# Patient Record
Sex: FEMALE | Race: ASIAN | NOT HISPANIC OR LATINO | Employment: FULL TIME | URBAN - METROPOLITAN AREA
[De-identification: names, ages, dates, MRNs, and addresses within clinical notes are randomized per-mention and may not be internally consistent; named-entity substitution may affect disease eponyms.]

---

## 2024-06-21 ENCOUNTER — OFFICE VISIT (OUTPATIENT)
Dept: OBSTETRICS AND GYNECOLOGY | Facility: CLINIC | Age: 34
End: 2024-06-21
Payer: COMMERCIAL

## 2024-06-21 VITALS — HEART RATE: 94 BPM | DIASTOLIC BLOOD PRESSURE: 72 MMHG | WEIGHT: 136.63 LBS | SYSTOLIC BLOOD PRESSURE: 108 MMHG

## 2024-06-21 DIAGNOSIS — Z76.89 ENCOUNTER TO ESTABLISH CARE: ICD-10-CM

## 2024-06-21 DIAGNOSIS — Z01.419 WELL WOMAN EXAM WITH ROUTINE GYNECOLOGICAL EXAM: Primary | ICD-10-CM

## 2024-06-21 RX ORDER — NORGESTIMATE AND ETHINYL ESTRADIOL 7DAYSX3 28
1 KIT ORAL
COMMUNITY
Start: 2024-04-06

## 2024-06-21 NOTE — PROGRESS NOTES
Subjective:      Patient ID: Florence Ortega is a 34 y.o. female who presents for evaluation today.    Chief Complaint:    Well Woman (Pt states that her last pap smear was in 2022. She just moved here and is going to be trying to another baby soon, so wanted to get established with someone.)      History of Present Illness  Annual Exam (Premenopausal) - Patient presents for annual exam. The patient has no complaints today. The patient is sexually active. GYN screening history: last pap: was normal. The patient wears seatbelts: yes. The patient participates in regular exercise: no. Has the patient ever been transfused or tattooed?: not asked. The patient reports that there is not domestic violence in her life. She has moved here from [place]. She has regular periods on her OCP. She may be seeking another pregnancy in the fall. She denies GI or  problems.    GYN History  Patient's last menstrual period was 2024 (approximate).   Date of Last Pap: Pap smear completed today with HPV co-testing    VITALS  /72   Pulse 94   Wt 62 kg (136 lb 9.6 oz)   LMP 2024 (Approximate)   Weight: 62 kg (136 lb 9.6 oz)         PAST MEDICAL HISTORY  History reviewed. No pertinent past medical history.    PAST SURGICAL HISTORY  Past Surgical History:   Procedure Laterality Date     SECTION  24       SOCIAL HISTORY  Social History     Tobacco Use   Smoking Status Never   Smokeless Tobacco Never   ,   Social History     Substance and Sexual Activity   Alcohol Use Yes    Alcohol/week: 4.0 standard drinks of alcohol    Types: 4 Glasses of wine per week        MEDICATIONS  Outpatient Medications Marked as Taking for the 24 encounter (Office Visit) with Carrie Gamez NP   Medication Sig Dispense Refill    TRI-SPRINTEC, 28, 0.18/0.215/0.25 mg-35 mcg (28) tablet Take 1 tablet by mouth.           Review of Systems   Review of Systems   Constitutional:  Negative for activity change.   Eyes:   Negative for visual disturbance.   Respiratory:  Negative for shortness of breath.    Cardiovascular:  Negative for chest pain.   Gastrointestinal:  Negative for abdominal pain.   Genitourinary:  Negative for menorrhagia, pelvic pain, vaginal bleeding and vaginal discharge.        No abnormal vaginal bleeding   Musculoskeletal:  Negative for back pain.   Integumentary:  Negative for rash and breast mass.   Neurological:  Negative for numbness.   Psychiatric/Behavioral:          No mood disturbance or changes    Breast: Negative for mass          Objective:     Physical Exam:   Constitutional: She is oriented to person, place, and time. She appears well-developed. She is cooperative.    HENT:   Head: Normocephalic.     Neck: Trachea normal. No thyromegaly present.    Cardiovascular:  Normal rate, regular rhythm and normal heart sounds.             Pulmonary/Chest: Effort normal and breath sounds normal. Right breast exhibits no mass, no nipple discharge and no skin change. Left breast exhibits no mass, no nipple discharge and no skin change.        Abdominal: Soft. There is no abdominal tenderness. There is no rebound and no guarding.     Genitourinary:    Vagina and uterus normal.      Pelvic exam was performed with patient supine.   The external female genitalia was normal.     Labial bartholins normal.There is no lesion on the right labia. There is no lesion on the left labia. Cervix is normal. Right adnexum displays no mass and no tenderness. Left adnexum displays no mass and no tenderness. Bleeding: menstrual. Cervix exhibits no discharge.    pap smear completedUterus is not enlarged and not tender.              Lymphadenopathy:        Head (right side): No submental and no submandibular adenopathy present.        Head (left side): No submental and no submandibular adenopathy present.     She has no cervical adenopathy.    Neurological: She is alert and oriented to person, place, and time.    Skin: Skin is  warm.    Psychiatric: She has a normal mood and affect. Her speech is normal and behavior is normal. Thought content normal.          Assessment:        1. Well woman exam with routine gynecological exam       Well woman exam with routine gynecological exam  -     Liquid-based pap smear, screening    Encounter to establish care  -     Ambulatory referral/consult to Internal Medicine; Future; Expected date: 06/28/2024       Plan:     Patient instructed to contact the clinic should any questions or concerns arise prior to her next office visit. Patient is happy with the plan of care at this time, verbalizes understanding and denies outstanding questions.      Pap  Self-breast exams  Birth Control discussed  If you don't hear from the office regarding results within 1 week, please call  Follow up in 1 year for annual or sooner as needed  Chaperone present for exam

## 2024-12-09 ENCOUNTER — TELEPHONE (OUTPATIENT)
Dept: OBSTETRICS AND GYNECOLOGY | Facility: CLINIC | Age: 34
End: 2024-12-09
Payer: COMMERCIAL

## 2024-12-09 NOTE — TELEPHONE ENCOUNTER
Phone call returned to patient.  No answer. I left a message on patient's voicemail  Dr Winkler is no longer taking New OB patients. I referred her to Jesús Amin Leggio and OBDULIA Cook

## 2024-12-09 NOTE — TELEPHONE ENCOUNTER
----- Message from Praveen Crawford sent at 12/9/2024  9:33 AM CST -----  Contact: Florence    ----- Message -----  From: Brandy Aguayo  Sent: 12/9/2024   9:06 AM CST  To: [facility] Staff    Patient is calling to receive a call back at [phone]. Reports being pregnant and wanting to schedule initial visit. Last menstrual Nov 9th.

## 2024-12-09 NOTE — TELEPHONE ENCOUNTER
----- Message from Praveen Crawford sent at 12/9/2024  9:33 AM CST -----  Contact: Florence    ----- Message -----  From: Brandy Aguayo  Sent: 12/9/2024   9:06 AM CST  To: [facility] Staff    Patient is calling to receive a call back at [phone]. Reports being pregnant and wanting to schedule initial visit. Last menstrual Nov 9th.

## 2024-12-10 DIAGNOSIS — Z34.81 ENCOUNTER FOR SUPERVISION OF OTHER NORMAL PREGNANCY IN FIRST TRIMESTER: Primary | ICD-10-CM

## 2025-01-02 ENCOUNTER — PATIENT MESSAGE (OUTPATIENT)
Dept: OBSTETRICS AND GYNECOLOGY | Facility: CLINIC | Age: 35
End: 2025-01-02
Payer: COMMERCIAL

## 2025-01-06 ENCOUNTER — PROCEDURE VISIT (OUTPATIENT)
Dept: OBSTETRICS AND GYNECOLOGY | Facility: CLINIC | Age: 35
End: 2025-01-06
Payer: COMMERCIAL

## 2025-01-06 DIAGNOSIS — Z34.81 ENCOUNTER FOR SUPERVISION OF OTHER NORMAL PREGNANCY IN FIRST TRIMESTER: ICD-10-CM

## 2025-01-06 PROCEDURE — 76801 OB US < 14 WKS SINGLE FETUS: CPT | Mod: 26,S$PBB,, | Performed by: OBSTETRICS & GYNECOLOGY

## 2025-01-22 ENCOUNTER — PATIENT MESSAGE (OUTPATIENT)
Dept: OBSTETRICS AND GYNECOLOGY | Facility: CLINIC | Age: 35
End: 2025-01-22
Payer: COMMERCIAL

## 2025-01-23 ENCOUNTER — INITIAL PRENATAL (OUTPATIENT)
Dept: OBSTETRICS AND GYNECOLOGY | Facility: CLINIC | Age: 35
End: 2025-01-23
Payer: COMMERCIAL

## 2025-01-23 VITALS — DIASTOLIC BLOOD PRESSURE: 79 MMHG | WEIGHT: 141.38 LBS | SYSTOLIC BLOOD PRESSURE: 121 MMHG | HEART RATE: 74 BPM

## 2025-01-23 DIAGNOSIS — Z98.891 HISTORY OF C-SECTION: ICD-10-CM

## 2025-01-23 DIAGNOSIS — Z34.81 ENCOUNTER FOR SUPERVISION OF OTHER NORMAL PREGNANCY IN FIRST TRIMESTER: Primary | ICD-10-CM

## 2025-01-23 PROCEDURE — 0500F INITIAL PRENATAL CARE VISIT: CPT | Mod: CPTII,,, | Performed by: OBSTETRICS & GYNECOLOGY

## 2025-01-23 NOTE — PROGRESS NOTES
Subjective:       Patient ID: Florence Ortega is a 35 y.o.  at 10w4d   Chief Complaint:  Initial Prenatal Visit      History of Present Illness  here for new ob exam.  Labs and history were reviewed with the patient today  No complaints      History reviewed. No pertinent past medical history.    Past Surgical History:   Procedure Laterality Date     SECTION  24       OB:    OB History    Para Term  AB Living   2 1 1     1   SAB IAB Ectopic Multiple Live Births                  # Outcome Date GA Lbr Willi/2nd Weight Sex Type Anes PTL Lv   2 Current            1 Term  41w0d    CS-Unspec        Gyn: no STD, never had abn pap   Meds:   Current Outpatient Medications:     TRI-SPRINTEC, 28, 0.18/0.215/0.25 mg-35 mcg (28) tablet, Take 1 tablet by mouth. (Patient not taking: Reported on 2025), Disp: , Rfl:     All: Review of patient's allergies indicates:  No Known Allergies    SH:   Social History     Tobacco Use    Smoking status: Never    Smokeless tobacco: Never   Substance Use Topics    Alcohol use: Not Currently     Alcohol/week: 4.0 standard drinks of alcohol     Types: 4 Glasses of wine per week      FH: family history includes Cancer in her father; Endometrial cancer (age of onset: 65) in her mother.      Review of Systems  nml 1st trimester sx- sob, dec excercixe tolerance, fatigue and nausea  Neg for vag bleed, dc, vomiting, cp, lof, fever, chills, ns, visual changes, swelling, headaches, constipation/diarrhea, dysuria, freq/urgency of urination     Objective:     Vitals:    25 1548   BP: 121/79   Pulse: 74   Weight: 64.1 kg (141 lb 6.4 oz)       NAD  NCAT  pupils normal size  Skin nml no rashes or lesions  No resp distress, resp even and unlabored  nt nd, no rebound no guarding  nml ext fem gent, normal cvx uterus and adnexa, no dc or bleeding  nml appearing rectum  No cyanosis or clubbing, edema appropriate for pregn    Uterus size approp for gest  age         Assessment:        1. Encounter for supervision of other normal pregnancy in first trimester    2. History of                Plan:      Encounter for supervision of other normal pregnancy in first trimester  -     Trichomonas Vaginalis, CHRISTEN  -     C. trachomatis/N. gonorrhoeae by AMP DNA [facility]; Cervix    History of          NIPT ordered   Pain fever bleeding precautions  Encouraged PNV  rtc 4 wks

## 2025-01-27 LAB
SOURCE: NORMAL
TRICHOMONAS AMPLIFIED: NEGATIVE

## 2025-01-28 LAB
CHLAMYDIA: NEGATIVE
GONORRHEA: NEGATIVE
SOURCE: NORMAL

## 2025-02-05 ENCOUNTER — PATIENT MESSAGE (OUTPATIENT)
Dept: OBSTETRICS AND GYNECOLOGY | Facility: CLINIC | Age: 35
End: 2025-02-05
Payer: COMMERCIAL

## 2025-02-19 ENCOUNTER — PATIENT MESSAGE (OUTPATIENT)
Dept: OBSTETRICS AND GYNECOLOGY | Facility: CLINIC | Age: 35
End: 2025-02-19
Payer: COMMERCIAL

## 2025-02-21 ENCOUNTER — ROUTINE PRENATAL (OUTPATIENT)
Dept: OBSTETRICS AND GYNECOLOGY | Facility: CLINIC | Age: 35
End: 2025-02-21
Payer: COMMERCIAL

## 2025-02-21 VITALS — HEART RATE: 90 BPM | WEIGHT: 141.19 LBS | DIASTOLIC BLOOD PRESSURE: 76 MMHG | SYSTOLIC BLOOD PRESSURE: 113 MMHG

## 2025-02-21 DIAGNOSIS — Z34.81 ENCOUNTER FOR SUPERVISION OF OTHER NORMAL PREGNANCY IN FIRST TRIMESTER: Primary | ICD-10-CM

## 2025-02-21 DIAGNOSIS — Z98.891 HISTORY OF C-SECTION: ICD-10-CM

## 2025-02-21 NOTE — PROGRESS NOTES
Subjective:       Patient ID: Florence Ortega is a 35 y.o.  at 14w5d     Chief Complaint:  Routine Prenatal Visit      History of Present Illness  No complaints. Labs and history reviewed with pt.         Review of Systems  Denies n/v, f/c, dysuria, contractions,   VD, VB, round ligament pain, headaches       Objective:     Vitals:    25 0806   BP: 113/76   Pulse: 90     Wt Readings from Last 3 Encounters:   25 64 kg (141 lb 3.2 oz)   25 64.1 kg (141 lb 6.4 oz)   24 62 kg (136 lb 9.6 oz)      nad  NCAT  pupils normal size  Skin nml no rashes or lesions  No resp distress, resp even and unlabored   nt, nd,  no rebound no guarding  No cyanosis or clubbing, edema appropriate for pregn  FHT: 140s   Assessment:        1. Encounter for supervision of other normal pregnancy in first trimester    2. History of                 Plan:         Encouraged PNV  Pain, fever, bleeding precautions   RTC 4 weeks

## 2025-03-02 ENCOUNTER — PATIENT MESSAGE (OUTPATIENT)
Dept: OTHER | Facility: OTHER | Age: 35
End: 2025-03-02
Payer: COMMERCIAL

## 2025-03-09 ENCOUNTER — PATIENT MESSAGE (OUTPATIENT)
Dept: OTHER | Facility: OTHER | Age: 35
End: 2025-03-09
Payer: COMMERCIAL

## 2025-03-21 ENCOUNTER — ROUTINE PRENATAL (OUTPATIENT)
Dept: OBSTETRICS AND GYNECOLOGY | Facility: CLINIC | Age: 35
End: 2025-03-21
Payer: COMMERCIAL

## 2025-03-21 VITALS — HEART RATE: 86 BPM | DIASTOLIC BLOOD PRESSURE: 71 MMHG | WEIGHT: 147.19 LBS | SYSTOLIC BLOOD PRESSURE: 104 MMHG

## 2025-03-21 DIAGNOSIS — Z98.891 HISTORY OF C-SECTION: ICD-10-CM

## 2025-03-21 DIAGNOSIS — Z34.81 ENCOUNTER FOR SUPERVISION OF OTHER NORMAL PREGNANCY IN FIRST TRIMESTER: Primary | ICD-10-CM

## 2025-03-21 NOTE — PROGRESS NOTES
Subjective:       Patient ID: Florence Ortega is a 35 y.o.  at 18w5d      Chief Complaint:  Routine Prenatal Visit      History of Present Illness  No complaints. Labs and history reviewed with pt.         Review of Systems  Denies n/v, f/c, dysuria, contractions,   VD, VB, round ligament pain, headaches       Objective:     Vitals:    25 0823   BP: 104/71   Pulse: 86       Wt Readings from Last 3 Encounters:   25 64 kg (141 lb 3.2 oz)   25 64.1 kg (141 lb 6.4 oz)   24 62 kg (136 lb 9.6 oz)      nad  NCAT  pupils normal size  Skin nml no rashes or lesions  No resp distress, resp even and unlabored   nt, nd,  no rebound no guarding  No cyanosis or clubbing, edema appropriate for pregn  FHT: 140s   Assessment:        1. Encounter for supervision of other normal pregnancy in first trimester    2. History of                   Plan:      Afp ordered    Encouraged PNV  Pain, fever, bleeding precautions   RTC 4 weeks

## 2025-03-25 ENCOUNTER — RESULTS FOLLOW-UP (OUTPATIENT)
Dept: OBSTETRICS AND GYNECOLOGY | Facility: CLINIC | Age: 35
End: 2025-03-25

## 2025-03-30 ENCOUNTER — PATIENT MESSAGE (OUTPATIENT)
Dept: OTHER | Facility: OTHER | Age: 35
End: 2025-03-30
Payer: COMMERCIAL

## 2025-04-08 DIAGNOSIS — Z34.82 ENCOUNTER FOR SUPERVISION OF OTHER NORMAL PREGNANCY IN SECOND TRIMESTER: Primary | ICD-10-CM

## 2025-04-17 ENCOUNTER — PROCEDURE VISIT (OUTPATIENT)
Dept: OBSTETRICS AND GYNECOLOGY | Facility: CLINIC | Age: 35
End: 2025-04-17
Payer: COMMERCIAL

## 2025-04-17 ENCOUNTER — ROUTINE PRENATAL (OUTPATIENT)
Dept: OBSTETRICS AND GYNECOLOGY | Facility: CLINIC | Age: 35
End: 2025-04-17
Payer: COMMERCIAL

## 2025-04-17 VITALS — WEIGHT: 149.63 LBS | HEART RATE: 87 BPM | SYSTOLIC BLOOD PRESSURE: 108 MMHG | DIASTOLIC BLOOD PRESSURE: 71 MMHG

## 2025-04-17 DIAGNOSIS — Z34.82 ENCOUNTER FOR SUPERVISION OF OTHER NORMAL PREGNANCY IN SECOND TRIMESTER: Primary | ICD-10-CM

## 2025-04-17 DIAGNOSIS — Z34.82 ENCOUNTER FOR SUPERVISION OF OTHER NORMAL PREGNANCY IN SECOND TRIMESTER: ICD-10-CM

## 2025-04-17 PROCEDURE — 0502F SUBSEQUENT PRENATAL CARE: CPT | Mod: CPTII,,, | Performed by: OBSTETRICS & GYNECOLOGY

## 2025-04-17 NOTE — PROGRESS NOTES
Subjective:       Patient ID: Florence Ortega is a 35 y.o.  at 22w4d       Chief Complaint:  Routine Prenatal Visit      History of Present Illness  No complaints. Labs and history reviewed with pt.         Review of Systems  Denies n/v, f/c, dysuria, contractions,   VD, VB, round ligament pain, headaches       Objective:     Vitals:    25 0907   BP: 108/71   Pulse: 87       Wt Readings from Last 3 Encounters:   25 64 kg (141 lb 3.2 oz)   25 64.1 kg (141 lb 6.4 oz)   24 62 kg (136 lb 9.6 oz)      nad  NCAT  pupils normal size  Skin nml no rashes or lesions  No resp distress, resp even and unlabored   nt, nd,  no rebound no guarding  No cyanosis or clubbing, edema appropriate for pregn  FHT: 140s   Assessment:        1. Encounter for supervision of other normal pregnancy in second trimester               Plan:      anatomy normal today   Encouraged PNV  Pain, fever, bleeding precautions   RTC 4 weeks

## 2025-04-25 ENCOUNTER — PATIENT MESSAGE (OUTPATIENT)
Dept: OBSTETRICS AND GYNECOLOGY | Facility: CLINIC | Age: 35
End: 2025-04-25
Payer: COMMERCIAL

## 2025-04-25 ENCOUNTER — TELEPHONE (OUTPATIENT)
Dept: OBSTETRICS AND GYNECOLOGY | Facility: CLINIC | Age: 35
End: 2025-04-25
Payer: COMMERCIAL

## 2025-04-25 NOTE — TELEPHONE ENCOUNTER
----- Message from Brittany sent at 4/25/2025  8:28 AM CDT -----  Contact: Florence  Type:  Needs Medical AdviceWho Called: Jenniferymptoms (please be specific): she is 23 weeks of pregnant,  blood in urine, pelvic pain areaHow long has patient had these symptoms:  since yesterdayPharmacy name and phone #:  [facility] - [place], [place] - [id number] [facility] [address]Cxodv: [phone] Fax: [phone] Would the patient rather a call back or a response via [facility]? Call [facility] Call Back Number: [phone]!

## 2025-04-27 ENCOUNTER — PATIENT MESSAGE (OUTPATIENT)
Dept: OTHER | Facility: OTHER | Age: 35
End: 2025-04-27
Payer: COMMERCIAL

## 2025-05-09 ENCOUNTER — ROUTINE PRENATAL (OUTPATIENT)
Dept: OBSTETRICS AND GYNECOLOGY | Facility: CLINIC | Age: 35
End: 2025-05-09
Payer: COMMERCIAL

## 2025-05-09 VITALS — HEART RATE: 102 BPM | WEIGHT: 157 LBS | DIASTOLIC BLOOD PRESSURE: 78 MMHG | SYSTOLIC BLOOD PRESSURE: 113 MMHG

## 2025-05-09 DIAGNOSIS — Z34.82 ENCOUNTER FOR SUPERVISION OF OTHER NORMAL PREGNANCY IN SECOND TRIMESTER: Primary | ICD-10-CM

## 2025-05-09 DIAGNOSIS — Z98.891 HISTORY OF C-SECTION: ICD-10-CM

## 2025-05-09 NOTE — PROGRESS NOTES
Subjective:       Patient ID: Florence Ortega is a 35 y.o.  at 25w5d        Chief Complaint:  Routine Prenatal Visit      History of Present Illness  No complaints. Labs and history reviewed with pt.         Review of Systems  Denies n/v, f/c, dysuria, contractions,   VD, VB, round ligament pain, headaches       Objective:     Vitals:    25 0953   BP: 113/78   Pulse: 102       Wt Readings from Last 3 Encounters:   25 64 kg (141 lb 3.2 oz)   25 64.1 kg (141 lb 6.4 oz)   24 62 kg (136 lb 9.6 oz)      nad  NCAT  pupils normal size  Skin nml no rashes or lesions  No resp distress, resp even and unlabored   nt, nd,  no rebound no guarding  No cyanosis or clubbing, edema appropriate for pregn  FHT: 140s   Assessment:        1. Encounter for supervision of other normal pregnancy in second trimester    2. History of                Plan:      O'sul ordered   Encouraged PNV  Pain, fever, bleeding precautions   RTC 4 weeks

## 2025-05-11 ENCOUNTER — PATIENT MESSAGE (OUTPATIENT)
Dept: OTHER | Facility: OTHER | Age: 35
End: 2025-05-11
Payer: COMMERCIAL

## 2025-05-12 ENCOUNTER — RESULTS FOLLOW-UP (OUTPATIENT)
Dept: OBSTETRICS AND GYNECOLOGY | Facility: CLINIC | Age: 35
End: 2025-05-12

## 2025-05-23 ENCOUNTER — TELEPHONE (OUTPATIENT)
Dept: OBSTETRICS AND GYNECOLOGY | Facility: CLINIC | Age: 35
End: 2025-05-23
Payer: COMMERCIAL

## 2025-05-23 DIAGNOSIS — R30.9 PAINFUL URINATION: Primary | ICD-10-CM

## 2025-05-23 NOTE — TELEPHONE ENCOUNTER
Patient came to clinic to leave urine sample. Urinalysis was done and resulted with large leuks and blood. Sending off for culture.

## 2025-05-30 DIAGNOSIS — N39.0 URINARY TRACT INFECTION WITH HEMATURIA, SITE UNSPECIFIED: Primary | ICD-10-CM

## 2025-05-30 DIAGNOSIS — R31.9 URINARY TRACT INFECTION WITH HEMATURIA, SITE UNSPECIFIED: Primary | ICD-10-CM

## 2025-05-30 RX ORDER — SULFAMETHOXAZOLE AND TRIMETHOPRIM 800; 160 MG/1; MG/1
1 TABLET ORAL 2 TIMES DAILY
Qty: 10 TABLET | Refills: 0 | Status: SHIPPED | OUTPATIENT
Start: 2025-05-30

## 2025-06-06 ENCOUNTER — ROUTINE PRENATAL (OUTPATIENT)
Dept: OBSTETRICS AND GYNECOLOGY | Facility: CLINIC | Age: 35
End: 2025-06-06
Payer: COMMERCIAL

## 2025-06-06 VITALS — SYSTOLIC BLOOD PRESSURE: 108 MMHG | DIASTOLIC BLOOD PRESSURE: 74 MMHG | WEIGHT: 158.81 LBS | HEART RATE: 92 BPM

## 2025-06-06 DIAGNOSIS — Z34.82 ENCOUNTER FOR SUPERVISION OF OTHER NORMAL PREGNANCY IN SECOND TRIMESTER: Primary | ICD-10-CM

## 2025-06-07 LAB — URINE CULTURE, ROUTINE: NORMAL

## 2025-06-08 ENCOUNTER — PATIENT MESSAGE (OUTPATIENT)
Dept: OTHER | Facility: OTHER | Age: 35
End: 2025-06-08
Payer: COMMERCIAL

## 2025-06-16 ENCOUNTER — RESULTS FOLLOW-UP (OUTPATIENT)
Dept: OBSTETRICS AND GYNECOLOGY | Facility: CLINIC | Age: 35
End: 2025-06-16

## 2025-06-16 ENCOUNTER — TELEPHONE (OUTPATIENT)
Dept: OBSTETRICS AND GYNECOLOGY | Facility: CLINIC | Age: 35
End: 2025-06-16
Payer: COMMERCIAL

## 2025-06-16 NOTE — TELEPHONE ENCOUNTER
Copied from CRM #[id number]. Topic: General Inquiry - Return Call  >> Jun 16, 2025  1:22 PM Gerald wrote:  Type:  Patient Returning Call    Who Called:jose   Who Left Message for Patient:amanda   Does the patient know what this is regarding?:yes  Would the patient rather a call back or a response via [facility]? Call back   Best Call Back Number:[phone]  Additional Information:

## 2025-06-18 DIAGNOSIS — Z34.82 ENCOUNTER FOR SUPERVISION OF OTHER NORMAL PREGNANCY IN SECOND TRIMESTER: Primary | ICD-10-CM

## 2025-06-18 RX ORDER — IRON,CARBONYL/ASCORBIC ACID 100-250 MG
1 TABLET ORAL DAILY
Qty: 30 TABLET | Refills: 11 | Status: SHIPPED | OUTPATIENT
Start: 2025-06-18

## 2025-06-20 ENCOUNTER — TELEPHONE (OUTPATIENT)
Dept: OBSTETRICS AND GYNECOLOGY | Facility: CLINIC | Age: 35
End: 2025-06-20
Payer: COMMERCIAL

## 2025-06-20 NOTE — TELEPHONE ENCOUNTER
Copied from CRM #[id number]. Topic: Appointments - Appointment Access  >> Jun 20, 2025  8:17 AM Afua wrote:  Patient is requesting a call back regarding US on 6/23 - asking can she get it done today instead . Please call back at [phone]

## 2025-06-22 ENCOUNTER — PATIENT MESSAGE (OUTPATIENT)
Dept: OTHER | Facility: OTHER | Age: 35
End: 2025-06-22
Payer: COMMERCIAL

## 2025-06-23 ENCOUNTER — PROCEDURE VISIT (OUTPATIENT)
Dept: OBSTETRICS AND GYNECOLOGY | Facility: CLINIC | Age: 35
End: 2025-06-23
Payer: COMMERCIAL

## 2025-06-23 ENCOUNTER — ROUTINE PRENATAL (OUTPATIENT)
Dept: OBSTETRICS AND GYNECOLOGY | Facility: CLINIC | Age: 35
End: 2025-06-23
Payer: COMMERCIAL

## 2025-06-23 VITALS — HEART RATE: 84 BPM | SYSTOLIC BLOOD PRESSURE: 115 MMHG | DIASTOLIC BLOOD PRESSURE: 76 MMHG | WEIGHT: 163 LBS

## 2025-06-23 DIAGNOSIS — Z34.93 ENCOUNTER FOR PREGNANCY RELATED EXAMINATION IN THIRD TRIMESTER: Primary | ICD-10-CM

## 2025-06-23 DIAGNOSIS — Z34.82 ENCOUNTER FOR SUPERVISION OF OTHER NORMAL PREGNANCY IN SECOND TRIMESTER: ICD-10-CM

## 2025-06-23 PROCEDURE — 76816 OB US FOLLOW-UP PER FETUS: CPT | Mod: 26,S$PBB,, | Performed by: OBSTETRICS & GYNECOLOGY

## 2025-06-23 PROCEDURE — 0502F SUBSEQUENT PRENATAL CARE: CPT | Mod: CPTII,,, | Performed by: OBSTETRICS & GYNECOLOGY

## 2025-06-23 NOTE — PROGRESS NOTES
Subjective:       Patient ID: Florence Ortega is a 35 y.o.  at 32w1d          Chief Complaint:  Routine Prenatal Visit      History of Present Illness  No complaints. Labs and history reviewed with pt.         Review of Systems  Denies n/v, f/c, dysuria, contractions,   VD, VB, round ligament pain, headaches       Objective:     Vitals:    25 0859   BP: 115/76   Pulse: 84       Wt Readings from Last 3 Encounters:   25 64 kg (141 lb 3.2 oz)   25 64.1 kg (141 lb 6.4 oz)   24 62 kg (136 lb 9.6 oz)      nad  NCAT  pupils normal size  Skin nml no rashes or lesions  No resp distress, resp even and unlabored   nt, nd,  no rebound no guarding  No cyanosis or clubbing, edema appropriate for pregn  FHT: 140s   Assessment:        1. Encounter for pregnancy related examination in third trimester               Plan:      Tdap, cbc   RCS scheduled   Encouraged PNV  Pain, fever, bleeding precautions   RTC 2 weeks

## 2025-07-02 ENCOUNTER — TELEPHONE (OUTPATIENT)
Dept: OBSTETRICS AND GYNECOLOGY | Facility: CLINIC | Age: 35
End: 2025-07-02
Payer: COMMERCIAL

## 2025-07-02 DIAGNOSIS — N39.0 URINARY TRACT INFECTION WITHOUT HEMATURIA, SITE UNSPECIFIED: Primary | ICD-10-CM

## 2025-07-02 RX ORDER — NITROFURANTOIN 25; 75 MG/1; MG/1
100 CAPSULE ORAL 2 TIMES DAILY
Qty: 14 CAPSULE | Refills: 0 | Status: SHIPPED | OUTPATIENT
Start: 2025-07-02 | End: 2025-07-09

## 2025-07-02 NOTE — TELEPHONE ENCOUNTER
Spoke with patient. She states she has fever and chills. Has been having since last night. Denies any bleeding or cramping. Patient states she can go to the employee health at her work or urgent care to get swabbed for flu/covid. Advised to take tylenol to help lower fever. Advised patient if fever reaches 102 or higher to report to labor and delivery. Patient verbalized understanding.

## 2025-07-07 ENCOUNTER — PATIENT MESSAGE (OUTPATIENT)
Dept: OBSTETRICS AND GYNECOLOGY | Facility: CLINIC | Age: 35
End: 2025-07-07
Payer: COMMERCIAL

## 2025-07-07 RX ORDER — SULFAMETHOXAZOLE AND TRIMETHOPRIM 400; 80 MG/1; MG/1
1 TABLET ORAL 2 TIMES DAILY
Qty: 10 TABLET | Refills: 0 | Status: SHIPPED | OUTPATIENT
Start: 2025-07-07 | End: 2025-07-12

## 2025-07-11 ENCOUNTER — ROUTINE PRENATAL (OUTPATIENT)
Dept: OBSTETRICS AND GYNECOLOGY | Facility: CLINIC | Age: 35
End: 2025-07-11
Payer: COMMERCIAL

## 2025-07-11 VITALS — DIASTOLIC BLOOD PRESSURE: 69 MMHG | HEART RATE: 99 BPM | SYSTOLIC BLOOD PRESSURE: 102 MMHG | WEIGHT: 163 LBS

## 2025-07-11 DIAGNOSIS — N39.0 URINARY TRACT INFECTION WITH HEMATURIA, SITE UNSPECIFIED: Primary | ICD-10-CM

## 2025-07-11 DIAGNOSIS — R31.9 URINARY TRACT INFECTION WITH HEMATURIA, SITE UNSPECIFIED: Primary | ICD-10-CM

## 2025-07-11 RX ORDER — NITROFURANTOIN 25; 75 MG/1; MG/1
100 CAPSULE ORAL DAILY
Qty: 30 CAPSULE | Refills: 2 | Status: SHIPPED | OUTPATIENT
Start: 2025-07-11 | End: 2025-10-09

## 2025-07-11 RX ORDER — CEPHALEXIN 500 MG/1
500 CAPSULE ORAL 2 TIMES DAILY
COMMUNITY
Start: 2025-07-06 | End: 2025-07-11

## 2025-07-11 NOTE — PROGRESS NOTES
Subjective:       Patient ID: Florence Ortega is a 35 y.o.  at 34w5d          Chief Complaint:  Routine Prenatal Visit      History of Present Illness  No complaints. Labs and history reviewed with pt.         Review of Systems  Denies n/v, f/c, dysuria, contractions,   VD, VB, round ligament pain, headaches       Objective:     Vitals:    25 0758   BP: 102/69   Pulse: 99       Wt Readings from Last 3 Encounters:   25 64 kg (141 lb 3.2 oz)   25 64.1 kg (141 lb 6.4 oz)   24 62 kg (136 lb 9.6 oz)      nad  NCAT  pupils normal size  Skin nml no rashes or lesions  No resp distress, resp even and unlabored   nt, nd,  no rebound no guarding  No cyanosis or clubbing, edema appropriate for pregn  FHT: 140s   Assessment:        1. Urinary tract infection with hematuria, site unspecified               Plan:      Tdap, cbc   RCS scheduled   Encouraged PNV  Pain, fever, bleeding precautions   RTC 2 weeks

## 2025-07-12 LAB — URINE CULTURE, ROUTINE: NORMAL

## 2025-07-13 ENCOUNTER — PATIENT MESSAGE (OUTPATIENT)
Dept: OTHER | Facility: OTHER | Age: 35
End: 2025-07-13
Payer: COMMERCIAL

## 2025-07-25 ENCOUNTER — ROUTINE PRENATAL (OUTPATIENT)
Dept: OBSTETRICS AND GYNECOLOGY | Facility: CLINIC | Age: 35
End: 2025-07-25
Payer: COMMERCIAL

## 2025-07-25 VITALS — SYSTOLIC BLOOD PRESSURE: 120 MMHG | WEIGHT: 164 LBS | DIASTOLIC BLOOD PRESSURE: 80 MMHG | HEART RATE: 94 BPM

## 2025-07-25 DIAGNOSIS — Z34.82 ENCOUNTER FOR SUPERVISION OF OTHER NORMAL PREGNANCY IN SECOND TRIMESTER: Primary | ICD-10-CM

## 2025-07-25 NOTE — PROGRESS NOTES
Subjective:       Patient ID: Florence Ortega is a 35 y.o.  at 36w5d           Chief Complaint:  Routine Prenatal Visit      History of Present Illness  No complaints. Labs and history reviewed with pt.         Review of Systems  Denies n/v, f/c, dysuria, contractions,   VD, VB, round ligament pain, headaches       Objective:     Vitals:    25 0811   BP: 120/80   Pulse: 94       Wt Readings from Last 3 Encounters:   25 64 kg (141 lb 3.2 oz)   25 64.1 kg (141 lb 6.4 oz)   24 62 kg (136 lb 9.6 oz)      nad  NCAT  pupils normal size  Skin nml no rashes or lesions  No resp distress, resp even and unlabored   nt, nd,  no rebound no guarding  No cyanosis or clubbing, edema appropriate for pregn  FHT: 140s   Assessment:        1. Encounter for supervision of other normal pregnancy in second trimester              Plan:      RCS scheduled   Encouraged PNV  Pain, fever, bleeding precautions   RTC 2 weeks

## 2025-08-01 ENCOUNTER — ROUTINE PRENATAL (OUTPATIENT)
Dept: OBSTETRICS AND GYNECOLOGY | Facility: CLINIC | Age: 35
End: 2025-08-01
Payer: COMMERCIAL

## 2025-08-01 VITALS — WEIGHT: 167 LBS | SYSTOLIC BLOOD PRESSURE: 108 MMHG | DIASTOLIC BLOOD PRESSURE: 74 MMHG | HEART RATE: 67 BPM

## 2025-08-01 DIAGNOSIS — Z34.82 ENCOUNTER FOR SUPERVISION OF OTHER NORMAL PREGNANCY IN SECOND TRIMESTER: Primary | ICD-10-CM

## 2025-08-01 NOTE — PROGRESS NOTES
Subjective:       Patient ID: Florence Ortega is a 35 y.o.  at 37w5d            Chief Complaint:  Routine Prenatal Visit      History of Present Illness  No complaints. Labs and history reviewed with pt.         Review of Systems  Denies n/v, f/c, dysuria, contractions,   VD, VB, round ligament pain, headaches       Objective:     Vitals:    25 0835   BP: 108/74   Pulse: 67       Wt Readings from Last 3 Encounters:   25 64 kg (141 lb 3.2 oz)   25 64.1 kg (141 lb 6.4 oz)   24 62 kg (136 lb 9.6 oz)      nad  NCAT  pupils normal size  Skin nml no rashes or lesions  No resp distress, resp even and unlabored   nt, nd,  no rebound no guarding  No cyanosis or clubbing, edema appropriate for pregn  FHT: 140s   Assessment:        1. Encounter for supervision of other normal pregnancy in second trimester                Plan:      RCS scheduled   Encouraged PNV  Pain, fever, bleeding precautions   RTC 1 weeks

## 2025-08-08 ENCOUNTER — ROUTINE PRENATAL (OUTPATIENT)
Dept: OBSTETRICS AND GYNECOLOGY | Facility: CLINIC | Age: 35
End: 2025-08-08
Payer: COMMERCIAL

## 2025-08-08 VITALS — SYSTOLIC BLOOD PRESSURE: 104 MMHG | HEART RATE: 93 BPM | DIASTOLIC BLOOD PRESSURE: 69 MMHG | WEIGHT: 167.63 LBS

## 2025-08-08 DIAGNOSIS — Z34.83 ENCOUNTER FOR SUPERVISION OF OTHER NORMAL PREGNANCY IN THIRD TRIMESTER: Primary | ICD-10-CM

## 2025-08-08 NOTE — PROGRESS NOTES
Subjective:       Patient ID: Florence Ortega is a 35 y.o.  at 37w5d            Chief Complaint:  Routine Prenatal Visit      History of Present Illness  No complaints. Labs and history reviewed with pt.         Review of Systems  Denies n/v, f/c, dysuria, contractions,   VD, VB, round ligament pain, headaches       Objective:     Vitals:    25 0844   BP: 104/69   Pulse: 93       Wt Readings from Last 3 Encounters:   25 64 kg (141 lb 3.2 oz)   25 64.1 kg (141 lb 6.4 oz)   24 62 kg (136 lb 9.6 oz)      nad  NCAT  pupils normal size  Skin nml no rashes or lesions  No resp distress, resp even and unlabored   nt, nd,  no rebound no guarding  No cyanosis or clubbing, edema appropriate for pregn  FHT: 140s   Assessment:        1. Encounter for supervision of other normal pregnancy in third trimester              Plan:      RCS scheduled   Encouraged PNV  Pain, fever, bleeding precautions

## 2025-08-12 ENCOUNTER — OUTSIDE PLACE OF SERVICE (OUTPATIENT)
Dept: OBSTETRICS AND GYNECOLOGY | Facility: CLINIC | Age: 35
End: 2025-08-12
Payer: COMMERCIAL

## 2025-08-22 ENCOUNTER — POSTPARTUM VISIT (OUTPATIENT)
Dept: OBSTETRICS AND GYNECOLOGY | Facility: CLINIC | Age: 35
End: 2025-08-22
Payer: COMMERCIAL

## 2025-08-22 VITALS — WEIGHT: 152 LBS | HEART RATE: 102 BPM | DIASTOLIC BLOOD PRESSURE: 82 MMHG | SYSTOLIC BLOOD PRESSURE: 113 MMHG

## 2025-08-22 DIAGNOSIS — Z98.891 S/P C-SECTION: Primary | ICD-10-CM
